# Patient Record
Sex: FEMALE | Race: WHITE | NOT HISPANIC OR LATINO | Employment: FULL TIME | URBAN - METROPOLITAN AREA
[De-identification: names, ages, dates, MRNs, and addresses within clinical notes are randomized per-mention and may not be internally consistent; named-entity substitution may affect disease eponyms.]

---

## 2017-03-19 RX ORDER — LEVONORGESTREL AND ETHINYL ESTRADIOL 0.1-0.02MG
1 KIT ORAL DAILY
Qty: 94 TAB | Refills: 0 | OUTPATIENT
Start: 2017-03-19 | End: 2019-08-14

## 2017-07-14 ENCOUNTER — GYNECOLOGY VISIT (OUTPATIENT)
Dept: OBGYN | Facility: CLINIC | Age: 32
End: 2017-07-14
Payer: COMMERCIAL

## 2017-07-14 ENCOUNTER — HOSPITAL ENCOUNTER (OUTPATIENT)
Facility: MEDICAL CENTER | Age: 32
End: 2017-07-14
Attending: OBSTETRICS & GYNECOLOGY
Payer: COMMERCIAL

## 2017-07-14 VITALS
WEIGHT: 158 LBS | HEIGHT: 66 IN | DIASTOLIC BLOOD PRESSURE: 62 MMHG | SYSTOLIC BLOOD PRESSURE: 102 MMHG | BODY MASS INDEX: 25.39 KG/M2

## 2017-07-14 DIAGNOSIS — Z01.419 WELL WOMAN EXAM: ICD-10-CM

## 2017-07-14 DIAGNOSIS — Z12.4 ROUTINE CERVICAL SMEAR: ICD-10-CM

## 2017-07-14 DIAGNOSIS — Z11.51 SCREENING FOR HPV (HUMAN PAPILLOMAVIRUS): ICD-10-CM

## 2017-07-14 DIAGNOSIS — Z30.41 FAMILY PLANNING, BCP (BIRTH CONTROL PILLS) MAINTENANCE: ICD-10-CM

## 2017-07-14 DIAGNOSIS — Z12.39 BREAST CANCER SCREENING, HIGH RISK PATIENT: ICD-10-CM

## 2017-07-14 PROCEDURE — 99395 PREV VISIT EST AGE 18-39: CPT | Performed by: OBSTETRICS & GYNECOLOGY

## 2017-07-14 PROCEDURE — 87624 HPV HI-RISK TYP POOLED RSLT: CPT

## 2017-07-14 PROCEDURE — 88175 CYTOPATH C/V AUTO FLUID REDO: CPT

## 2017-07-14 RX ORDER — LEVONORGESTREL AND ETHINYL ESTRADIOL 0.1-0.02MG
1 KIT ORAL DAILY
Qty: 84 TAB | Refills: 4 | Status: SHIPPED | OUTPATIENT
Start: 2017-07-14 | End: 2018-07-30 | Stop reason: SDUPTHER

## 2017-07-14 NOTE — MR AVS SNAPSHOT
"        Zoey Mastersjesus   2017 10:45 AM   Gynecology Visit   MRN: [id number]    Department:  [facility]   Dept Phone:  [phone]    Description:  Female : 1985   Provider:  Dileep Dodge M.D.           Reason for Visit     Gynecologic Exam           Allergies as of 2017     Allergen Noted Reactions    Nkda [No Known Drug Allergy] 2011         You were diagnosed with     Well woman exam   [115475]       Routine cervical smear   [075157]       Screening for HPV (human papillomavirus)   [563852]       Family planning, BCP (birth control pills) maintenance   [036169]       Breast cancer screening, high risk patient   [200778]         Vital Signs     Blood Pressure Height Weight Body Mass Index Last Menstrual Period Breastfeeding?    102/62 mmHg 1.676 m (5' 6\") 71.668 kg (158 lb) 25.51 kg/m2 2017 No    Smoking Status                   Never Smoker            Basic Information     Date Of Birth Sex Race Ethnicity Preferred Language    1985 Female White Non- English      Problem List              ICD-10-CM Priority Class Noted - Resolved    Irregular periods/menstrual cycles N92.6   2014 - Present    Breast cancer screening, high risk patient Z12.39 High  2017 - Present      Health Maintenance        Date Due Completion Dates    IMM DTaP/Tdap/Td Vaccine (1 - Tdap) 7/10/2005 ---    IMM INFLUENZA (1) 2017 ---    PAP SMEAR 2020 (Done), 2016, 2016 (Done), 2015, 2015 (Done), 2014, 2013, 2011    Override on 2017: Done    Override on 2016: Done    Override on 2015: Done            Current Immunizations     No immunizations on file.      Below and/or attached are the medications your provider expects you to take. Review all of your home medications and newly ordered medications with your provider and/or pharmacist. Follow medication instructions as directed by your provider and/or pharmacist. Please " keep your medication list with you and share with your provider. Update the information when medications are discontinued, doses are changed, or new medications (including over-the-counter products) are added; and carry medication information at all times in the event of emergency situations     Allergies:  NKDA - (reactions not documented)               Medications  Valid as of: July 14, 2017 - 10:45 AM    Generic Name Brand Name Tablet Size Instructions for use    Fluconazole (Tab) DIFLUCAN 150 MG Take 1 Tab by mouth every day.        Fluticasone Propionate (Suspension) FLONASE 50 MCG/ACT Spray 1 Spray in nose 2 times a day. Each Nostril        Ibuprofen (Tab) MOTRIN 800 MG Take 800 mg by mouth every 8 hours as needed.        Levonorgestrel-Ethinyl Estrad (Tab) AVIANE, ALESSE, LESSINA 0.1-20 MG-MCG Take 1 Tab by mouth every day.        Levonorgestrel-Ethinyl Estrad (Tab) AVIANE, ALESSE, LESSINA 0.1-20 MG-MCG Take 1 Tab by mouth every day.        Levonorgestrel-Ethinyl Estrad (Tab) AVIANE, ALESSE, LESSINA 0.1-20 MG-MCG Take 1 Tab by mouth every day.        maalox plus-benadryl-xylocaine (MBX) (Suspension) MBX  Take 5 mL by mouth every 6 hours as needed.        Norethin-Eth Estrad-Fe Biphas (Tab) Norethin-Eth Estrad-Fe Biphas 1 MG-10 MCG / 10 MCG Take 1 Tab by mouth every day.        Norethin-Eth Estrad-Fe Biphas (Tab) Norethin-Eth Estrad-Fe Biphas 1 MG-10 MCG / 10 MCG Take 1 Tab by mouth every day.        Oxycodone-Acetaminophen (Tab) PERCOCET 5-325 MG Take 1-2 Tabs by mouth every four hours as needed.        Phenazopyridine HCl (Tab) PYRIDIUM 200 MG Take 1 Tab by mouth 3 times a day as needed.        .                 Medicines prescribed today were sent to:     [facility] - [place], [place] - [address]    [address] [facility] [address]    Phone: [phone] Fax: [phone]    Open 24 Hours?: No    [facility] [place] - [address]    [address]  [place] [address]    Phone: [phone] Fax: [phone]    Open 24 Hours?: No      Medication refill instructions:       If your prescription bottle indicates you have medication refills left, it is not necessary to call your provider’s office. Please contact your pharmacy and they will refill your medication.    If your prescription bottle indicates you do not have any refills left, you may request refills at any time through one of the following ways: The online [facility] system (except Urgent Care), by calling your provider’s office, or by asking your pharmacy to contact your provider’s office with a refill request. Medication refills are processed only during regular business hours and may not be available until the next business day. Your provider may request additional information or to have a follow-up visit with you prior to refilling your medication.   *Please Note: Medication refills are assigned a new Rx number when refilled electronically. Your pharmacy may indicate that no refills were authorized even though a new prescription for the same medication is available at the pharmacy. Please request the medicine by name with the pharmacy before contacting your provider for a refill.        Your To Do List     Future Labs/Procedures Complete By Expires    THINPREP PAP WITH HPV  As directed 7/14/2018         [facility] Access Code: Activation code not generated  Current [facility] Status: Active

## 2017-07-14 NOTE — PROGRESS NOTES
"Zoey Masterst32 y.o.  female presents for Annual Well Woman Exam.    ROS: Patient is feeling well. No dyspnea or chest pain on exertion. No Abdominal pain, change in bowel habits, black or bloody stools. No urinary sx. GYN ROS:normal menses, no abnormal bleeding, pelvic pain or discharge, no breast pain or new or enlarging lumps on self exam. Denies breast tenderness, mass, discharge, changes in size or contour, or abnormal cyclic discomfort. No neurological complaints.  Past Medical History   Diagnosis Date   • Hemorrhoids    • Supervision of other normal pregnancy 2011   • Irregular periods/menstrual cycles 2014     Recent irregular cycles      None  Allergy:   Nkda    LMP:       Patient's last menstrual period was 2017. .     Menstrual History: menses regular every 28 days  Contraceptive Method:OCP (estrogen/progesterone)      Objective : The patient appears well, alert and oriented x 3, in no acute distress.  Blood pressure 102/62, height 1.676 m (5' 6\"), weight 71.668 kg (158 lb), last menstrual period 2017, not currently breastfeeding.  HEENT Exam: EOMI, MITZI, no adenopathy or thyromegaly.  Lungs: Clear to Auscultation and Percussion.  Cor: S1 and S2 normal, no murmurs, or rubs   Abdomen: Soft without tenderness, guarding mass or organomegaly.  Extremities: No edema, pulses equal  Neurological: Normal No focal signs  Breast Exam:Inspection negative. No nipple discharge or bleeding. No masses or nodularity palpable, negative findings: normal in size and symmetry, normal contour with no evidence of flattening or dimpling, skin normal, nipples everted without rashes or discharge, palpation negative for masses or nodules  Pelvic: External genitalia,urethral meatus, urethra, bladder and vagina normal. Cervix, uterus and adnexa intact and normal.  Anus and perineum normal. Bimanual and rectovaginal without masses or tenderness.    Lab:No results found for this or any previous visit " (from the past 336 hour(s)).    Assessment:  well woman  no contraindication to continue use of oral contraceptives    Plan:mammogram  At age 36   pap smear  return annually or prn  Self Breast Exams  Contraception:oral contraceptives

## 2017-07-15 LAB
CYTOLOGY REG CYTOL: NORMAL
HPV HR 12 DNA CVX QL NAA+PROBE: NEGATIVE
HPV16 DNA SPEC QL NAA+PROBE: NEGATIVE
HPV18 DNA SPEC QL NAA+PROBE: NEGATIVE
SPECIMEN SOURCE: NORMAL

## 2018-01-25 ENCOUNTER — TELEPHONE (OUTPATIENT)
Dept: OBGYN | Facility: CLINIC | Age: 33
End: 2018-01-25

## 2018-02-02 ENCOUNTER — TELEPHONE (OUTPATIENT)
Dept: OBGYN | Facility: CLINIC | Age: 33
End: 2018-02-02

## 2018-02-02 NOTE — TELEPHONE ENCOUNTER
----- Message from Jasmyn Norris sent at 2/1/2018  3:48 PM PST -----  Regarding: refill request  Contact: [phone]  Patient would like for her levonorgestrel-ethinyl estradiol (AVIANE, ALESSE, KENISHA) sent over to pharmacy [facility].their phone number is [phone] and fax num is [phone].thank you.

## 2018-07-30 DIAGNOSIS — Z30.41 FAMILY PLANNING, BCP MAINTENANCE: ICD-10-CM

## 2018-07-30 NOTE — TELEPHONE ENCOUNTER
----- Message from Daniela Martinez sent at 7/30/2018  9:59 AM PDT -----  Regarding: Bith control refill  Contact: [phone]  Patient called is requesting a refill on her birth control. States she only has enough to get her through the month of August. Thank You     Refill request has been sent to Dr Nielsen for approval.     Pt has upcoming apt on 9/21/18 for annual exam with Dr Dodge.

## 2018-08-01 RX ORDER — LEVONORGESTREL AND ETHINYL ESTRADIOL 0.1-0.02MG
1 KIT ORAL DAILY
Qty: 84 TAB | Refills: 0 | Status: SHIPPED | OUTPATIENT
Start: 2018-08-01 | End: 2019-08-09 | Stop reason: SDUPTHER

## 2018-09-21 ENCOUNTER — HOSPITAL ENCOUNTER (OUTPATIENT)
Facility: MEDICAL CENTER | Age: 33
End: 2018-09-21
Attending: OBSTETRICS & GYNECOLOGY
Payer: COMMERCIAL

## 2018-09-21 ENCOUNTER — GYNECOLOGY VISIT (OUTPATIENT)
Dept: OBGYN | Facility: CLINIC | Age: 33
End: 2018-09-21
Payer: COMMERCIAL

## 2018-09-21 VITALS
WEIGHT: 167 LBS | HEIGHT: 66 IN | SYSTOLIC BLOOD PRESSURE: 110 MMHG | BODY MASS INDEX: 26.84 KG/M2 | DIASTOLIC BLOOD PRESSURE: 66 MMHG

## 2018-09-21 DIAGNOSIS — Z01.419 WELL WOMAN EXAM: ICD-10-CM

## 2018-09-21 DIAGNOSIS — Z12.4 SCREENING FOR CERVICAL CANCER: ICD-10-CM

## 2018-09-21 DIAGNOSIS — Z11.51 SPECIAL SCREENING EXAMINATION FOR HUMAN PAPILLOMAVIRUS (HPV): ICD-10-CM

## 2018-09-21 PROCEDURE — 88175 CYTOPATH C/V AUTO FLUID REDO: CPT

## 2018-09-21 PROCEDURE — 99395 PREV VISIT EST AGE 18-39: CPT | Performed by: OBSTETRICS & GYNECOLOGY

## 2018-09-21 PROCEDURE — 87624 HPV HI-RISK TYP POOLED RSLT: CPT

## 2018-09-21 RX ORDER — LEVONORGESTREL AND ETHINYL ESTRADIOL 0.1-0.02MG
1 KIT ORAL DAILY
Qty: 28 TAB | Refills: 4 | Status: SHIPPED | OUTPATIENT
Start: 2018-09-21 | End: 2019-08-14

## 2018-09-21 NOTE — PROGRESS NOTES
"Zoey Masterst33 y.o.  female presents for Annual Well Woman Exam.    ROS: Patient is feeling well. No dyspnea or chest pain on exertion. No Abdominal pain, change in bowel habits, black or bloody stools. No urinary sx. GYN ROS:normal menses, no abnormal bleeding, pelvic pain or discharge, no breast pain or new or enlarging lumps on self exam. Denies breast tenderness, mass, discharge, changes in size or contour, or abnormal cyclic discomfort. No neurological complaints.  Past Medical History:   Diagnosis Date   • Hemorrhoids    • Irregular periods/menstrual cycles 2014    Recent irregular cycles    • Supervision of other normal pregnancy 2011     OCP usage   Allergy:   Nkda [no known drug allergy]    LMP:       Patient's last menstrual period was 2018. .     Menstrual History: menses regular every 28  Days, now with some late menstrual\"  LLQ cramping \"  Contraceptive Method:OCP (estrogen/progesterone)  Family History   Problem Relation Age of Onset   • Cancer Mother       Age 46 at diagnosis       Objective : The patient appears well, alert and oriented x 3, in no acute distress.  Blood pressure 110/66, height 1.676 m (5' 6\"), weight 75.8 kg (167 lb), last menstrual period 2018, not currently breastfeeding.  HEENT Exam: EOMI, MITZI, no adenopathy or thyromegaly.  Lungs: Clear to Auscultation and Percussion.  Cor: S1 and S2 normal, no murmurs, or rubs   Abdomen: Soft without tenderness, guarding mass or organomegaly.  Extremities: No edema, pulses equal  Neurological: Normal No focal signs  Breast Exam:Inspection negative. No nipple discharge or bleeding. No masses or nodularity palpable, negative findings: normal in size and symmetry, normal contour with no evidence of flattening or dimpling, skin normal, nipples everted without rashes or discharge  Pelvic: External genitalia,urethral meatus, urethra, bladder and vagina normal. Cervix, uterus and adnexa intact and normal.  Anus and " perineum normal. Bimanual and rectovaginal without masses or tenderness.    Lab:No results found for this or any previous visit (from the past 336 hour(s)).    Assessment:  well woman  Family hx of Breast Cancer     Plan:pap smear  return annually or prn  discuss lefted sided pain .   Contraception:oral contraceptives

## 2018-09-27 DIAGNOSIS — Z11.51 SPECIAL SCREENING EXAMINATION FOR HUMAN PAPILLOMAVIRUS (HPV): ICD-10-CM

## 2018-09-27 DIAGNOSIS — Z01.419 WELL WOMAN EXAM: ICD-10-CM

## 2018-09-27 DIAGNOSIS — Z12.4 SCREENING FOR CERVICAL CANCER: ICD-10-CM

## 2018-09-27 LAB — AMBIGUOUS DTTM AMBI4: NORMAL

## 2019-07-28 ENCOUNTER — APPOINTMENT (OUTPATIENT)
Dept: URGENT CARE | Facility: PHYSICIAN GROUP | Age: 34
End: 2019-07-28
Payer: COMMERCIAL

## 2019-07-28 ENCOUNTER — OFFICE VISIT (OUTPATIENT)
Dept: URGENT CARE | Facility: PHYSICIAN GROUP | Age: 34
End: 2019-07-28
Payer: COMMERCIAL

## 2019-07-28 ENCOUNTER — HOSPITAL ENCOUNTER (OUTPATIENT)
Facility: MEDICAL CENTER | Age: 34
End: 2019-07-28
Attending: PHYSICIAN ASSISTANT
Payer: COMMERCIAL

## 2019-07-28 VITALS
BODY MASS INDEX: 26.47 KG/M2 | RESPIRATION RATE: 16 BRPM | HEART RATE: 74 BPM | OXYGEN SATURATION: 99 % | WEIGHT: 164 LBS | TEMPERATURE: 97.9 F

## 2019-07-28 DIAGNOSIS — N30.00 ACUTE CYSTITIS WITHOUT HEMATURIA: ICD-10-CM

## 2019-07-28 DIAGNOSIS — R30.9 URINARY PAIN: ICD-10-CM

## 2019-07-28 LAB
APPEARANCE UR: NORMAL
BILIRUB UR STRIP-MCNC: NORMAL MG/DL
COLOR UR AUTO: YELLOW
GLUCOSE UR STRIP.AUTO-MCNC: NORMAL MG/DL
INT CON NEG: NORMAL
INT CON POS: NORMAL
KETONES UR STRIP.AUTO-MCNC: NORMAL MG/DL
LEUKOCYTE ESTERASE UR QL STRIP.AUTO: NORMAL
NITRITE UR QL STRIP.AUTO: NORMAL
PH UR STRIP.AUTO: 6 [PH] (ref 5–8)
POC URINE PREGNANCY TEST: NEGATIVE
PROT UR QL STRIP: NORMAL MG/DL
RBC UR QL AUTO: NORMAL
SP GR UR STRIP.AUTO: 1.02
UROBILINOGEN UR STRIP-MCNC: 0.2 MG/DL

## 2019-07-28 PROCEDURE — 87086 URINE CULTURE/COLONY COUNT: CPT

## 2019-07-28 PROCEDURE — 99214 OFFICE O/P EST MOD 30 MIN: CPT | Performed by: PHYSICIAN ASSISTANT

## 2019-07-28 PROCEDURE — 81025 URINE PREGNANCY TEST: CPT | Performed by: PHYSICIAN ASSISTANT

## 2019-07-28 PROCEDURE — 81002 URINALYSIS NONAUTO W/O SCOPE: CPT | Performed by: PHYSICIAN ASSISTANT

## 2019-07-28 RX ORDER — NITROFURANTOIN 25; 75 MG/1; MG/1
100 CAPSULE ORAL EVERY 12 HOURS
Qty: 10 CAP | Refills: 0 | Status: SHIPPED | OUTPATIENT
Start: 2019-07-28 | End: 2019-08-02

## 2019-07-28 ASSESSMENT — ENCOUNTER SYMPTOMS
SHORTNESS OF BREATH: 0
PALPITATIONS: 0
FLANK PAIN: 0
CHILLS: 0
COUGH: 0
FEVER: 0
BACK PAIN: 0

## 2019-07-28 NOTE — PROGRESS NOTES
Subjective:      Zoey Ng is a 34 y.o. female who presents with Urinary Pain (x3d worse yesterday )            UTI   This is a new problem. The current episode started in the past 7 days. The problem occurs constantly. Associated symptoms include urinary symptoms. Pertinent negatives include no chest pain, chills, coughing or fever. Nothing aggravates the symptoms. She has tried nothing for the symptoms.       Review of Systems   Constitutional: Negative for chills and fever.   Respiratory: Negative for cough and shortness of breath.    Cardiovascular: Negative for chest pain and palpitations.   Genitourinary: Positive for dysuria, frequency and urgency. Negative for flank pain and hematuria.   Musculoskeletal: Negative for back pain.   All other systems reviewed and are negative.    PMH:  has a past medical history of Hemorrhoids; Irregular periods/menstrual cycles (5/20/2014); and Supervision of other normal pregnancy (9/2/2011).  MEDS:   Current Outpatient Prescriptions:   •  nitrofurantoin monohyd macro (MACROBID) 100 MG Cap, Take 1 Cap by mouth every 12 hours for 5 days., Disp: 10 Cap, Rfl: 0  •  levonorgestrel-ethinyl estradiol (AVIANE, ALESSE, LESSINA) 0.1-20 MG-MCG per tablet, Take 1 Tab by mouth every day. (Patient not taking: Reported on 7/28/2019), Disp: 28 Tab, Rfl: 4  •  levonorgestrel-ethinyl estradiol (AVIANE, ALESSE, LESSINA) 0.1-20 MG-MCG per tablet, Take 1 Tab by mouth every day. (Patient not taking: Reported on 7/28/2019), Disp: 84 Tab, Rfl: 0  •  levonorgestrel-ethinyl estradiol (AVIANE, ALESSE, LESSINA) 0.1-20 MG-MCG per tablet, Take 1 Tab by mouth every day. (Patient not taking: Reported on 7/28/2019), Disp: 94 Tab, Rfl: 0  •  phenazopyridine (PYRIDIUM) 200 MG Tab, Take 1 Tab by mouth 3 times a day as needed. (Patient not taking: Reported on 7/28/2019), Disp: 6 Tab, Rfl: 0  •  levonorgestrel-ethinyl estradiol (AVIANE, ALESSE, LESSINA) 0.1-20 MG-MCG per tablet, Take 1 Tab by mouth every  day. (Patient not taking: Reported on 7/28/2019), Disp: 28 Tab, Rfl: 3  •  Norethin-Eth Estrad-Fe Biphas (LO LOESTRIN FE) 1 MG-10 MCG / 10 MCG TABS, Take 1 Tab by mouth every day. (Patient not taking: Reported on 7/28/2019), Disp: 84 Tab, Rfl: 4  •  Norethin-Eth Estrad-Fe Biphas (LO LOESTRIN FE) 1 MG-10 MCG / 10 MCG TABS, Take 1 Tab by mouth every day. (Patient not taking: Reported on 7/28/2019), Disp: 84 Tab, Rfl: 1  •  fluconazole (DIFLUCAN) 150 MG tablet, Take 1 Tab by mouth every day. (Patient not taking: Reported on 7/28/2019), Disp: 1 Tab, Rfl: 0  •  maalox plus-benadryl-xylocaine (MBX), Take 5 mL by mouth every 6 hours as needed. (Patient not taking: Reported on 7/28/2019), Disp: 90 mL, Rfl: 0  •  fluticasone (FLONASE) 50 MCG/ACT nasal spray, Spray 1 Spray in nose 2 times a day. Each Nostril (Patient not taking: Reported on 7/28/2019), Disp: 1 Bottle, Rfl: 0  •  ibuprofen (MOTRIN) 800 MG TABS, Take 800 mg by mouth every 8 hours as needed., Disp: , Rfl:   •  oxycodone-acetaminophen (PERCOCET) 5-325 MG TABS, Take 1-2 Tabs by mouth every four hours as needed., Disp: , Rfl:   ALLERGIES:   Allergies   Allergen Reactions   • Nkda [No Known Drug Allergy]      SURGHX: No past surgical history on file.  SOCHX:  reports that she has never smoked. She has never used smokeless tobacco. She reports that she does not drink alcohol or use drugs.  FH: Family history was reviewed, no pertinent findings to report  Medications, Allergies, and current problem list reviewed today in Epic       Objective:     Pulse 74   Temp 36.6 °C (97.9 °F) (Temporal)   Resp 16   Wt 74.4 kg (164 lb)   SpO2 99%   BMI 26.47 kg/m²      Physical Exam   Constitutional: She is oriented to person, place, and time. She appears well-developed and well-nourished.   HENT:   Head: Normocephalic and atraumatic.   Right Ear: External ear normal.   Left Ear: External ear normal.   Nose: Nose normal.   Mouth/Throat: Oropharynx is clear and moist.   Neck:  Normal range of motion. Neck supple.   Cardiovascular: Normal rate, regular rhythm and normal heart sounds.    Pulmonary/Chest: Effort normal and breath sounds normal.   Abdominal: Soft.   Musculoskeletal: She exhibits no tenderness.   No CVA tenderness present.   Neurological: She is alert and oriented to person, place, and time.   Skin: Skin is warm and dry.   Psychiatric: She has a normal mood and affect. Her behavior is normal. Judgment and thought content normal.   Vitals reviewed.              Assessment/Plan:     1. Urinary pain    - POCT Pregnancy  - POCT Urinalysis  - POCT Urinalysis  - Urine Culture; Future  - nitrofurantoin monohyd macro (MACROBID) 100 MG Cap; Take 1 Cap by mouth every 12 hours for 5 days.  Dispense: 10 Cap; Refill: 0    2. Acute cystitis without hematuria    - POCT Urinalysis  - Urine Culture; Future  - nitrofurantoin monohyd macro (MACROBID) 100 MG Cap; Take 1 Cap by mouth every 12 hours for 5 days.  Dispense: 10 Cap; Refill: 0    Differential diagnosis, natural history, supportive care discussed. Follow-up with primary care provider within 7-10 days, emergency room precautions discussed.  Patient and/or family appears understanding of information.  Handout and review of patients diagnosis and treatment was discussed extensively.

## 2019-07-29 DIAGNOSIS — R30.9 URINARY PAIN: ICD-10-CM

## 2019-07-29 DIAGNOSIS — N30.00 ACUTE CYSTITIS WITHOUT HEMATURIA: ICD-10-CM

## 2019-07-31 LAB
BACTERIA UR CULT: ABNORMAL
BACTERIA UR CULT: ABNORMAL
SIGNIFICANT IND 70042: ABNORMAL
SITE SITE: ABNORMAL
SOURCE SOURCE: ABNORMAL

## 2019-08-09 DIAGNOSIS — Z30.41 FAMILY PLANNING, BCP MAINTENANCE: ICD-10-CM

## 2019-08-14 RX ORDER — LEVONORGESTREL AND ETHINYL ESTRADIOL 0.1-0.02MG
1 KIT ORAL DAILY
Qty: 84 TAB | Refills: 0 | Status: SHIPPED | OUTPATIENT
Start: 2019-08-14 | End: 2019-10-15 | Stop reason: SDUPTHER

## 2019-10-15 ENCOUNTER — HOSPITAL ENCOUNTER (OUTPATIENT)
Facility: MEDICAL CENTER | Age: 34
End: 2019-10-15
Attending: ADVANCED PRACTICE MIDWIFE
Payer: COMMERCIAL

## 2019-10-15 ENCOUNTER — GYNECOLOGY VISIT (OUTPATIENT)
Dept: OBGYN | Facility: MEDICAL CENTER | Age: 34
End: 2019-10-15
Payer: COMMERCIAL

## 2019-10-15 VITALS — SYSTOLIC BLOOD PRESSURE: 110 MMHG | WEIGHT: 167 LBS | BODY MASS INDEX: 26.95 KG/M2 | DIASTOLIC BLOOD PRESSURE: 68 MMHG

## 2019-10-15 DIAGNOSIS — Z01.419 ENCOUNTER FOR GYNECOLOGICAL EXAMINATION (GENERAL) (ROUTINE) WITHOUT ABNORMAL FINDINGS: ICD-10-CM

## 2019-10-15 DIAGNOSIS — Z30.41 FAMILY PLANNING, BCP MAINTENANCE: ICD-10-CM

## 2019-10-15 DIAGNOSIS — Z12.4 CERVICAL CANCER SCREENING: ICD-10-CM

## 2019-10-15 DIAGNOSIS — Z12.39 BREAST CANCER SCREENING, HIGH RISK PATIENT: ICD-10-CM

## 2019-10-15 PROCEDURE — 88175 CYTOPATH C/V AUTO FLUID REDO: CPT

## 2019-10-15 PROCEDURE — 99395 PREV VISIT EST AGE 18-39: CPT | Performed by: ADVANCED PRACTICE MIDWIFE

## 2019-10-15 RX ORDER — LEVONORGESTREL AND ETHINYL ESTRADIOL 0.1-0.02MG
1 KIT ORAL DAILY
Qty: 84 TAB | Refills: 4 | Status: SHIPPED | OUTPATIENT
Start: 2019-10-15 | End: 2023-09-14

## 2019-10-15 NOTE — PROGRESS NOTES
Zoey Ng is a 34 y.o. female who presents for her Gynecologic Exam         HPI Comments: Pt presents for well woman exam. Pt has no complaints. LMP 10/5/19. Denies pain with last intercourse. She reports regular menses with birth control use that are 3 days in length. No clots or heavy bleeding. She is using OCPs for birth control and  had a vasectomy. Pt describes significantly improved mood stability and menstrual cycle regularity with OCP use. Does not desire conception in the next year.  Pt reports cyclic headache, denies aura, n/v. Headaches relieved with ibuprofen/ Excedrin use.     Patient has noticed worsening stress incontinence. Not doing any pelvic floor exercises, not affecting quality of life at this time.     Mother did have partial hysterectomy at age 39, mastectomy at 45, and oopherectomy at age 36. Maternal grandmother  from complications of melanoma at age 41.      Review of Systems   Pertinent positives documented in HPI and all other systems reviewed & are negative     All PMH, PSH, allergies, social history and FH reviewed and updated today:  Past Medical History        Past Medical History:   Diagnosis Date   • Hemorrhoids     • Irregular periods/menstrual cycles 2014     Recent irregular cycles    • Supervision of other normal pregnancy 2011         Past Surgical History   History reviewed. No pertinent surgical history.     Nkda [no known drug allergy]  Social History               Socioeconomic History   • Marital status:        Spouse name: Not on file   • Number of children: Not on file   • Years of education: Not on file   • Highest education level: Not on file   Occupational History   • Not on file   Social Needs   • Financial resource strain: Not on file   • Food insecurity:       Worry: Not on file       Inability: Not on file   • Transportation needs:       Medical: Not on file       Non-medical: Not on file   Tobacco Use   • Smoking status: Never  Smoker   • Smokeless tobacco: Never Used   Substance and Sexual Activity   • Alcohol use: No   • Drug use: No   • Sexual activity: Yes       Partners: Male       Birth control/protection: Pill       Comment: pregnant now   Lifestyle   • Physical activity:       Days per week: Not on file       Minutes per session: Not on file   • Stress: Not on file   Relationships   • Social connections:       Talks on phone: Not on file       Gets together: Not on file       Attends Voodoo service: Not on file       Active member of club or organization: Not on file       Attends meetings of clubs or organizations: Not on file       Relationship status: Not on file   • Intimate partner violence:       Fear of current or ex partner: Not on file       Emotionally abused: Not on file       Physically abused: Not on file       Forced sexual activity: Not on file   Other Topics Concern   • Not on file   Social History Narrative   • Not on file         Family History         Family History   Problem Relation Age of Onset   • Cancer Mother     • No Known Problems Father     • No Known Problems Sister           Medications:   Current Medications and Prescriptions Ordered in [facility]          Current Outpatient Medications Ordered in Epic   Medication Sig Dispense Refill   • levonorgestrel-ethinyl estradiol (AVIANE, ALESSE, LESSINA) 0.1-20 MG-MCG per tablet Take 1 Tab by mouth every day. 84 Tab 4   • ibuprofen (MOTRIN) 800 MG TABS Take 800 mg by mouth every 8 hours as needed.       • phenazopyridine (PYRIDIUM) 200 MG Tab Take 1 Tab by mouth 3 times a day as needed. (Patient not taking: Reported on 7/28/2019) 6 Tab 0   • fluconazole (DIFLUCAN) 150 MG tablet Take 1 Tab by mouth every day. (Patient not taking: Reported on 7/28/2019) 1 Tab 0   • maalox plus-benadryl-xylocaine (MBX) Take 5 mL by mouth every 6 hours as needed. (Patient not taking: Reported on 7/28/2019) 90 mL 0   • fluticasone (FLONASE) 50 MCG/ACT nasal spray Spray 1 Spray in  nose 2 times a day. Each Nostril (Patient not taking: Reported on 7/28/2019) 1 Bottle 0   • oxycodone-acetaminophen (PERCOCET) 5-325 MG TABS Take 1-2 Tabs by mouth every four hours as needed.          No current Epic-ordered facility-administered medications on file.                 Objective:   Vital measurements:  /68   Wt 75.8 kg (167 lb)   Body mass index is 26.95 kg/m². (Goal BM I>18 <25)     Physical Exam   Nursing note and vitals reviewed.  Constitutional: She is oriented to person, place, and time. She appears well-developed and well-nourished. No distress.      HEENT: Normocephalic and atraumatic. External ears normal. Nose normal. Conjunctivae and EOM are normal. Pupils are equal, round, and reactive to light. No scleral icterus.      Neck: Normal range of motion. Neck supple. No thyromegaly present.      Pulmonary/Chest: Effort normal and breath sounds clear in all quadrants. No respiratory distress.      Cardiovascular: Regular, rate and rhythm. No JVD.     Abdominal: Soft. Bowel sounds are normal. She exhibits no distension and no mass. No tenderness. She has no rebound and no guarding.      Breast: Supple, without skin changes, dimpling. No pain with nipple manipulation. Fibrocystic tissue observed.    Genitourinary:  Pelvic exam was performed with patient supine.  External genitalia with no abnormal pigmentation, labial fusion,rash, tenderness, lesion or injury to the labia bilaterally.  Vagina is moist with no lesions, foul discharge, erythema, tenderness or bleeding. No foreign body around the vagina or signs of injury.   Cervix exhibits no motion tenderness, no discharge and no friability.   Uterus is anteverted, not deviated, enlarged, or tender.  Right adnexum displays no mass, no tenderness and no fullness. Left adnexum displays no mass, no tenderness and no fullness.   Rectal exam deferred     Musculoskeletal: Normal range of motion. She exhibits no edema and no tenderness.    Lymphadenopathy: She has no cervical adenopathy.   Neurological: She is alert and oriented to person, place, and time. She exhibits normal muscle tone.   Skin: Skin is warm and dry. No rash noted. She is not diaphoretic. No erythema. No pallor.   Psychiatric: She has a normal mood and affect. Her behavior is normal. Judgment and thought content normal.                  Assessment:      1. Family planning, BCP maintenance  levonorgestrel-ethinyl estradiol (AVIANE, ALESSE, LESSINA) 0.1-20 MG-MCG per tablet   2. Cervical cancer screening  THINPREP PAP, REFLEX HPV ON ASC-US AND ABOVE            Plan:   1. Pap and physical exam performed. Discussed intervals of paps at current age per guidelines. Will notify of results.  2. Encouraged general breast awareness and self breast exam if appropriate. We discussed recommendation of yearly mammogram at age 36 given maternal history.    3. Counseling: breast self exam and use and side effects of OCPs  4. Patient is interested in having genetic testing. We discussed that generally most labs will require two persons in the family to be affected to have insurance coverage for testing. However, she may consider [facility] for her testing as they screen out for 1 affected family member and keep information on file long term for variants of unknown significance. Patient can access kit herself and we complete form if she desires.   5. Pelvic floor exercises reviewed. Consider PT referral if no symptom improvement.     RAMONA Bae/ Elijah ZHOU

## 2019-10-16 LAB — CYTOLOGY REG CYTOL: NORMAL

## 2020-10-11 ENCOUNTER — OFFICE VISIT (OUTPATIENT)
Dept: URGENT CARE | Facility: PHYSICIAN GROUP | Age: 35
End: 2020-10-11
Payer: COMMERCIAL

## 2020-10-11 ENCOUNTER — HOSPITAL ENCOUNTER (OUTPATIENT)
Facility: MEDICAL CENTER | Age: 35
End: 2020-10-11
Attending: PHYSICIAN ASSISTANT
Payer: COMMERCIAL

## 2020-10-11 VITALS
SYSTOLIC BLOOD PRESSURE: 112 MMHG | OXYGEN SATURATION: 99 % | BODY MASS INDEX: 28.28 KG/M2 | RESPIRATION RATE: 16 BRPM | TEMPERATURE: 98.4 F | HEIGHT: 66 IN | WEIGHT: 176 LBS | HEART RATE: 75 BPM | DIASTOLIC BLOOD PRESSURE: 72 MMHG

## 2020-10-11 DIAGNOSIS — R35.0 URINARY FREQUENCY: ICD-10-CM

## 2020-10-11 DIAGNOSIS — T36.95XA ANTIBIOTIC-INDUCED YEAST INFECTION: ICD-10-CM

## 2020-10-11 DIAGNOSIS — R30.0 DYSURIA: ICD-10-CM

## 2020-10-11 DIAGNOSIS — N30.01 ACUTE CYSTITIS WITH HEMATURIA: ICD-10-CM

## 2020-10-11 DIAGNOSIS — R39.15 URINARY URGENCY: ICD-10-CM

## 2020-10-11 DIAGNOSIS — B37.9 ANTIBIOTIC-INDUCED YEAST INFECTION: ICD-10-CM

## 2020-10-11 LAB
APPEARANCE UR: CLEAR
BILIRUB UR STRIP-MCNC: NEGATIVE MG/DL
COLOR UR AUTO: YELLOW
GLUCOSE UR STRIP.AUTO-MCNC: NEGATIVE MG/DL
KETONES UR STRIP.AUTO-MCNC: NEGATIVE MG/DL
LEUKOCYTE ESTERASE UR QL STRIP.AUTO: NORMAL
NITRITE UR QL STRIP.AUTO: NEGATIVE
PH UR STRIP.AUTO: 5.5 [PH] (ref 5–8)
PROT UR QL STRIP: NEGATIVE MG/DL
RBC UR QL AUTO: NORMAL
SP GR UR STRIP.AUTO: 1.01
UROBILINOGEN UR STRIP-MCNC: NEGATIVE MG/DL

## 2020-10-11 PROCEDURE — 81002 URINALYSIS NONAUTO W/O SCOPE: CPT | Performed by: PHYSICIAN ASSISTANT

## 2020-10-11 PROCEDURE — 87077 CULTURE AEROBIC IDENTIFY: CPT

## 2020-10-11 PROCEDURE — 99214 OFFICE O/P EST MOD 30 MIN: CPT | Performed by: PHYSICIAN ASSISTANT

## 2020-10-11 PROCEDURE — 87186 SC STD MICRODIL/AGAR DIL: CPT

## 2020-10-11 PROCEDURE — 87086 URINE CULTURE/COLONY COUNT: CPT

## 2020-10-11 RX ORDER — CEFDINIR 300 MG/1
300 CAPSULE ORAL EVERY 12 HOURS
Qty: 10 CAP | Refills: 0 | Status: SHIPPED | OUTPATIENT
Start: 2020-10-11 | End: 2020-10-16

## 2020-10-11 RX ORDER — FLUCONAZOLE 150 MG/1
150 TABLET ORAL DAILY
Qty: 1 TAB | Refills: 0 | Status: SHIPPED | OUTPATIENT
Start: 2020-10-11

## 2020-10-11 NOTE — PROGRESS NOTES
Chief Complaint   Patient presents with   • UTI       HISTORY OF PRESENT ILLNESS: Patient is a 35 y.o. female who presents today for the following:    Patient comes in for evaluation of dysuria that started yesterday.  She complains of increased urinary frequency and urgency, all of which are worse today.  She denies fever, night sweats, chills, body aches, flank pain, abdominal pain, nausea, vomiting.  She does report history of UTI but not frequently.  She is currently on her menstrual cycle.    Patient Active Problem List    Diagnosis Date Noted   • Breast cancer screening, high risk patient 07/14/2017     Priority: High   • Irregular periods/menstrual cycles 05/20/2014       Allergies:Nkda [no known drug allergy]    Current Outpatient Medications Ordered in Epic   Medication Sig Dispense Refill   • cefdinir (OMNICEF) 300 MG Cap Take 1 Cap by mouth every 12 hours for 5 days. 10 Cap 0   • fluconazole (DIFLUCAN) 150 MG tablet Take 1 Tab by mouth every day. 1 Tab 0   • levonorgestrel-ethinyl estradiol (AVIANE, ALESSE, LESSINA) 0.1-20 MG-MCG per tablet Take 1 Tab by mouth every day. 84 Tab 4   • maalox plus-benadryl-xylocaine (MBX) Take 5 mL by mouth every 6 hours as needed. (Patient not taking: Reported on 7/28/2019) 90 mL 0     No current Epic-ordered facility-administered medications on file.        Past Medical History:   Diagnosis Date   • Hemorrhoids    • Irregular periods/menstrual cycles 5/20/2014    Recent irregular cycles    • Supervision of other normal pregnancy 9/2/2011       Social History     Tobacco Use   • Smoking status: Never Smoker   • Smokeless tobacco: Never Used   Substance Use Topics   • Alcohol use: No   • Drug use: No       Family Status   Relation Name Status   • Mo  Alive   • Fa  Alive   • Sis  Alive     Family History   Problem Relation Age of Onset   • Cancer Mother    • No Known Problems Father    • No Known Problems Sister        Review of Systems:   Constitutional ROS: No unexpected  "change in weight, No weakness, No fatigue  Pulmonary ROS: No chronic cough, sputum, or hemoptysis, No dyspnea on exertion, No wheezing  Cardiovascular ROS: No diaphoresis, No edema, No palpitations  Gastrointestinal ROS: No change in bowel habits, No significant change in appetite, No nausea, vomiting, diarrhea, or constipation  Hematologic/Lymphatic ROS: No chills, No night sweats, No weight loss  Skin/Integumentary ROS: No edema, No evidence of rash, No itching      Exam:  /72   Pulse 75   Temp 36.9 °C (98.4 °F) (Temporal)   Resp 16   Ht 1.664 m (5' 5.5\")   Wt 79.8 kg (176 lb)   SpO2 99%   General: Well developed, well nourished. No distress.  Pulmonary: Unlabored respiratory effort.   Back: No CVA tenderness noted.  Neurologic: Grossly nonfocal. No facial asymmetry noted.  Skin: Warm, dry, good turgor. No rashes in visible areas.   Psych: Normal mood. Alert and oriented x3. Judgment and insight is normal.    UA: Moderate blood, moderate leukocyte esterase, otherwise negative    Assessment/Plan:  Drink plenty of fluids. Will contact patient with culture results. Use all medication as directed. Follow up for worsening or persistent symptoms.  1. Acute cystitis with hematuria  POCT Urinalysis   2. Dysuria  Urine Culture    cefdinir (OMNICEF) 300 MG Cap   3. Urinary frequency  POCT Urinalysis   4. Urinary urgency  POCT Urinalysis   5. Antibiotic-induced yeast infection  fluconazole (DIFLUCAN) 150 MG tablet       "

## 2020-10-12 DIAGNOSIS — R30.0 DYSURIA: ICD-10-CM

## 2020-11-02 ENCOUNTER — HOSPITAL ENCOUNTER (OUTPATIENT)
Facility: MEDICAL CENTER | Age: 35
End: 2020-11-02
Attending: OBSTETRICS & GYNECOLOGY
Payer: COMMERCIAL

## 2020-11-02 ENCOUNTER — GYNECOLOGY VISIT (OUTPATIENT)
Dept: OBGYN | Facility: CLINIC | Age: 35
End: 2020-11-02
Payer: COMMERCIAL

## 2020-11-02 VITALS — WEIGHT: 173 LBS | SYSTOLIC BLOOD PRESSURE: 106 MMHG | DIASTOLIC BLOOD PRESSURE: 72 MMHG | BODY MASS INDEX: 28.35 KG/M2

## 2020-11-02 DIAGNOSIS — Z01.419 ENCOUNTER FOR GYNECOLOGICAL EXAMINATION (GENERAL) (ROUTINE) WITHOUT ABNORMAL FINDINGS: ICD-10-CM

## 2020-11-02 DIAGNOSIS — Z30.41 FAMILY PLANNING, BCP (BIRTH CONTROL PILLS) MAINTENANCE: ICD-10-CM

## 2020-11-02 PROCEDURE — 99395 PREV VISIT EST AGE 18-39: CPT | Performed by: OBSTETRICS & GYNECOLOGY

## 2020-11-02 PROCEDURE — 87624 HPV HI-RISK TYP POOLED RSLT: CPT

## 2020-11-02 PROCEDURE — 88175 CYTOPATH C/V AUTO FLUID REDO: CPT

## 2020-11-02 RX ORDER — LEVONORGESTREL AND ETHINYL ESTRADIOL 0.1-0.02MG
1 KIT ORAL DAILY
Qty: 84 TAB | Refills: 4 | Status: SHIPPED | OUTPATIENT
Start: 2020-11-02 | End: 2023-09-14

## 2020-11-02 NOTE — NON-PROVIDER
Pt here for annual exam  Pt states no complaints   Pharmacy verified   Pap:10/16/19 normal  Lmp:11/2/20  Bc: OCP

## 2020-11-02 NOTE — PROGRESS NOTES
Zoey Masterst35 y.o.  female presents for Annual Well Woman Exam.    ROS: Patient is feeling well. No dyspnea or chest pain on exertion. No Abdominal pain, change in bowel habits, black or bloody stools. No urinary sx. GYN ROS:normal menses, no abnormal bleeding, pelvic pain or discharge, no breast pain or new or enlarging lumps on self exam. Denies breast tenderness, mass, discharge, changes in size or contour, or abnormal cyclic discomfort., Negative breast symptoms: tenderness, pain, mass, cyst No neurological complaints.some  Migraines   Past Medical History:   Diagnosis Date   • Hemorrhoids    • Irregular periods/menstrual cycles 2014    Recent irregular cycles    • Supervision of other normal pregnancy 2011     OCP's   Allergy:   Nkda [no known drug allergy]    LMP:       Patient's last menstrual period was 2020. .     Menstrual History: menses regular every 28 days  Contraceptive Method:none  Family History   Problem Relation Age of Onset   • Cancer Mother    • No Known Problems Father    • No Known Problems Sister      Age 45 for mother     Objective : The patient appears well, alert and oriented x 3, in no acute distress.  /72   Wt 78.5 kg (173 lb)   HEENT Exam: EOMI, MITZI, no adenopathy or thyromegaly.  Lungs: Clear to Auscultation and Percussion.  Cor: S1 and S2 normal, no murmurs, or rubs   Abdomen: Soft without tenderness, guarding mass or organomegaly.  Extremities: No edema, pulses equal  Neurological: Normal No focal signs  Breast Exam:Inspection negative. No nipple discharge or bleeding. No masses or nodularity palpable, negative findings: normal in size and symmetry, normal contour with no evidence of flattening or dimpling, skin normal  Pelvic: External genitalia,urethral meatus, urethra, bladder and vagina normal. Cervix, uterus and adnexa intact and normal.  Anus and perineum normal. Bimanual and rectovaginal without masses or tenderness.    Lab:No results found  for this or any previous visit (from the past 336 hour(s)).    Assessment:  well woman  May continue  OCP's : cycle control : SO with vasectomy   Plan:mammogram  : next year   pap smear  return annually or prn  Self Breast Exams  Contraception:none

## 2020-11-03 DIAGNOSIS — Z01.419 ENCOUNTER FOR GYNECOLOGICAL EXAMINATION (GENERAL) (ROUTINE) WITHOUT ABNORMAL FINDINGS: ICD-10-CM

## 2023-09-14 ENCOUNTER — HOSPITAL ENCOUNTER (OUTPATIENT)
Facility: MEDICAL CENTER | Age: 38
End: 2023-09-14
Attending: PHYSICIAN ASSISTANT
Payer: COMMERCIAL

## 2023-09-14 ENCOUNTER — GYNECOLOGY VISIT (OUTPATIENT)
Dept: OBGYN | Facility: CLINIC | Age: 38
End: 2023-09-14
Payer: COMMERCIAL

## 2023-09-14 VITALS
DIASTOLIC BLOOD PRESSURE: 60 MMHG | HEIGHT: 66 IN | SYSTOLIC BLOOD PRESSURE: 116 MMHG | WEIGHT: 175 LBS | BODY MASS INDEX: 28.12 KG/M2

## 2023-09-14 DIAGNOSIS — Z01.419 WELL WOMAN EXAM: ICD-10-CM

## 2023-09-14 DIAGNOSIS — Z12.4 SCREENING FOR CERVICAL CANCER: ICD-10-CM

## 2023-09-14 PROCEDURE — 3074F SYST BP LT 130 MM HG: CPT | Performed by: PHYSICIAN ASSISTANT

## 2023-09-14 PROCEDURE — 3078F DIAST BP <80 MM HG: CPT | Performed by: PHYSICIAN ASSISTANT

## 2023-09-14 PROCEDURE — 88175 CYTOPATH C/V AUTO FLUID REDO: CPT

## 2023-09-14 PROCEDURE — 87624 HPV HI-RISK TYP POOLED RSLT: CPT

## 2023-09-14 PROCEDURE — 99395 PREV VISIT EST AGE 18-39: CPT | Performed by: PHYSICIAN ASSISTANT

## 2023-09-14 NOTE — PROGRESS NOTES
ANNUAL GYNECOLOGY VISIT    Chief Complaint  Annual    Subjective  Zoey Ng is a 38 y.o. female  Patient's last menstrual period was 2023 using vasectomy for contraception who presents today for Annual Exam. Previously on OCPs due to irregular menses but stopped due to migraines with aura. Menses have been fairly regular since stopping, will occasionally skip a cycle but this is rare.       Preventive Care     There is no immunization history on file for this patient.      Gynecology History and ROS  Current Sexual Activity: yes - monogamous male partner   History of sexually transmitted diseases? no  Abnormal discharge? no  Current Contraception:  vasectomy     Menstrual History  Patient's last menstrual period was 2023.  Periods are regular  q 27-30 days, lasting 5 days.   Clots or heavy flow: usually 2nd day with clots  Dysmenorrhea: no  Intermenstrual bleeding/spotting: no  Significant pain with periods:no  Bothersome PMS symptoms: no  Significant Pelvic Pain: no    Pap History  Last pap smear: 2020  History of moderate or severe dysplasia: no    Cancer Risk Assessement:  Family history of:   - Breast cancer: Mother    - Ovarian cancer: no   - Uterine cancer: no   - Colon cancer: no    Obstetric History  OB History    Para Term  AB Living   3 3 3     3   SAB IAB Ectopic Molar Multiple Live Births             3      # Outcome Date GA Lbr Fer/2nd Weight Sex Delivery Anes PTL Lv   3 Term 11  07:00 7 lb 5.5 oz M Vag-Spont   AUSTEN      Birth Comments: No complications.  A little bit of more bleeding than usual.   2 Term    6 lb M Vag-Spont  N AUSTEN   1 Term    9 lb 6 oz M Vag-Spont  N AUSTEN       Past Medical History  Past Medical History:   Diagnosis Date    Hemorrhoids     Irregular periods/menstrual cycles 2014    Recent irregular cycles     Supervision of other normal pregnancy 2011       Past Surgical History  History reviewed. No pertinent surgical  "history.    Social History  Social History     Tobacco Use    Smoking status: Never    Smokeless tobacco: Never   Vaping Use    Vaping Use: Never used   Substance Use Topics    Alcohol use: Yes     Comment: 2 times weekly    Drug use: No        Family History  Family History   Problem Relation Age of Onset    Cancer Mother     No Known Problems Father     No Known Problems Sister        Home Medications  Current Outpatient Medications   Medication Sig    levonorgestrel-ethinyl estradiol (AVIANE) 0.1-20 MG-MCG per tablet Take 1 Tab by mouth every day.    fluconazole (DIFLUCAN) 150 MG tablet Take 1 Tab by mouth every day. (Patient not taking: Reported on 11/2/2020)    levonorgestrel-ethinyl estradiol (AVIANE, ALESSE, LESSINA) 0.1-20 MG-MCG per tablet Take 1 Tab by mouth every day.    maalox plus-benadryl-xylocaine (MBX) Take 5 mL by mouth every 6 hours as needed. (Patient not taking: Reported on 7/28/2019)       Allergies/Reactions  Allergies   Allergen Reactions    Nkda [No Known Drug Allergy]        ROS  Positive ROS: see HPI  Gen: no fevers or chills, no significant weight loss or gain, excessive fatigue  Respiratory:  no cough or dyspnea  Cardiac:  no chest pain, no palpitations, no syncope  Breast: no breast discharge, pain, lump or skin changes  GI:  no heartburn, no abdominal pain, no nausea or vomiting  Urinary: no dysuria, urgency, frequency, incontinence   Psych: no depression or anxiety  Neuro: no migraines with aura, fainting spells, numbness or tingling  Extremities: no joint pain, persistently swollen ankles, recurrent leg cramps      Physical Examination:  Vital Signs: /60   Ht 5' 6\"   Wt 175 lb   LMP 08/07/2023 Comment:  has vasectomy  BMI 28.25 kg/m²       Constitutional: The patient is well developed and well nourished.  Psychiatric: Patient is oriented to time place and person.   Skin: No rash observed.  Neck: Appears symmetric. Thyroid normal size  Respiratory: normal " effort  Breast: Inspection reveals no asymetry or nipple discharge, no skin thickening, dimpling or erythema.  Palpation demonstrates no masses.  Abdomen: Soft, non-tender.  Pelvic Exam:      Vulva: external female genitalia are normal in appearance. No lesions     Urethra - no lesions, no erythema     Vagina: moist, pink, normal ruggae     Cervix: pink, smooth, no lesions, no CMT     Uterus - non-tender, normal size, shape, contour, mobile, anteverted     Ovaries: non-tender, no appreciable masses    Pap Smear performed: Yes    Chaperone Present: CRIS Jacobson  Extremeties: Legs are symmetric and without tenderness. There is no edema present.        Assessment & Plan  Zoey Ng is a 38 y.o. female who presents today for Annual Gyn Exam.     1. Well woman exam    - Anticipatory guidance given. Encouraged adequate water intake, healthy diet, regular exercise. Educated on Pap smear screening and guidelines for age per ACOG and ASSCP. Discussed safe sex, STI prevention, contraception/family planning. Self breast exam taught.     2. Screening for cervical cancer    - THINPREP PAP WITH HPV; Future            Return: Annually or PRN    Teresa Jones P.A.-C.

## 2023-09-15 DIAGNOSIS — Z12.4 SCREENING FOR CERVICAL CANCER: ICD-10-CM

## 2023-09-17 LAB
CYTOLOGIST CVX/VAG CYTO: NORMAL
CYTOLOGY CVX/VAG DOC CYTO: NORMAL
CYTOLOGY CVX/VAG DOC THIN PREP: NORMAL
HPV I/H RISK 4 DNA CVX QL PROBE+SIG AMP: NEGATIVE
NOTE NL11727A: NORMAL
OTHER STN SPEC: NORMAL
QC REVIEWED BY NL11722A: NORMAL
STAT OF ADQ CVX/VAG CYTO-IMP: NORMAL